# Patient Record
Sex: MALE | Race: WHITE | NOT HISPANIC OR LATINO | Employment: OTHER | URBAN - METROPOLITAN AREA
[De-identification: names, ages, dates, MRNs, and addresses within clinical notes are randomized per-mention and may not be internally consistent; named-entity substitution may affect disease eponyms.]

---

## 2018-02-14 NOTE — PATIENT DISCUSSION
The IOP is borderline. The visual field is without clear signs of progression. The patient will continue the current management at this time.

## 2022-05-20 ENCOUNTER — NEW PATIENT (OUTPATIENT)
Dept: URBAN - METROPOLITAN AREA CLINIC 32 | Facility: CLINIC | Age: 67
End: 2022-05-20

## 2022-05-20 DIAGNOSIS — H04.123: ICD-10-CM

## 2022-05-20 DIAGNOSIS — H43.813: ICD-10-CM

## 2022-05-20 DIAGNOSIS — H25.13: ICD-10-CM

## 2022-05-20 DIAGNOSIS — H02.831: ICD-10-CM

## 2022-05-20 PROCEDURE — 99204 OFFICE O/P NEW MOD 45 MIN: CPT

## 2022-05-20 PROCEDURE — 92015 DETERMINE REFRACTIVE STATE: CPT

## 2022-05-20 ASSESSMENT — VISUAL ACUITY
OS_GLARE: 20/30+2
OS_SC: J1-2
OS_CC: 20/25-1
OD_SC: J2+2
OD_CC: 20/25-2
OS_PH: 20/25-1
OD_GLARE: 20/30
OS_SC: 20/50-2
OD_SC: 20/30-1

## 2022-05-20 ASSESSMENT — KERATOMETRY
OS_AXISANGLE2_DEGREES: 75
OD_AXISANGLE2_DEGREES: 105
OD_AXISANGLE_DEGREES: 15
OD_K2POWER_DIOPTERS: 42.25
OS_AXISANGLE_DEGREES: 165
OD_K1POWER_DIOPTERS: 43.75
OS_K2POWER_DIOPTERS: 42.00
OS_K1POWER_DIOPTERS: 44.00

## 2022-05-20 ASSESSMENT — TONOMETRY
OS_IOP_MMHG: 13
OD_IOP_MMHG: 12